# Patient Record
Sex: FEMALE | Race: BLACK OR AFRICAN AMERICAN | NOT HISPANIC OR LATINO | Employment: FULL TIME | URBAN - METROPOLITAN AREA
[De-identification: names, ages, dates, MRNs, and addresses within clinical notes are randomized per-mention and may not be internally consistent; named-entity substitution may affect disease eponyms.]

---

## 2017-01-30 ENCOUNTER — APPOINTMENT (OUTPATIENT)
Dept: WOMENS IMAGING | Facility: HOSPITAL | Age: 52
End: 2017-01-30

## 2017-01-30 PROCEDURE — 77067 SCR MAMMO BI INCL CAD: CPT | Performed by: RADIOLOGY

## 2017-01-30 PROCEDURE — G0202 SCR MAMMO BI INCL CAD: HCPCS | Performed by: RADIOLOGY

## 2017-08-08 ENCOUNTER — HOSPITAL ENCOUNTER (EMERGENCY)
Dept: HOSPITAL 23 - CED | Age: 52
Discharge: HOME | End: 2017-08-08
Payer: COMMERCIAL
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
## 2017-08-08 DIAGNOSIS — I10: ICD-10-CM

## 2017-08-08 DIAGNOSIS — Z88.0: ICD-10-CM

## 2017-08-08 DIAGNOSIS — S83.91XA: Primary | ICD-10-CM
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
## 2017-11-09 ENCOUNTER — OFFICE VISIT (OUTPATIENT)
Dept: OBSTETRICS AND GYNECOLOGY | Facility: CLINIC | Age: 52
End: 2017-11-09

## 2017-11-09 VITALS
WEIGHT: 147 LBS | BODY MASS INDEX: 28.86 KG/M2 | HEART RATE: 74 BPM | DIASTOLIC BLOOD PRESSURE: 83 MMHG | HEIGHT: 60 IN | SYSTOLIC BLOOD PRESSURE: 159 MMHG

## 2017-11-09 DIAGNOSIS — Z01.419 ENCOUNTER FOR GYNECOLOGICAL EXAMINATION WITHOUT ABNORMAL FINDING: ICD-10-CM

## 2017-11-09 DIAGNOSIS — Z12.11 COLON CANCER SCREENING: Primary | ICD-10-CM

## 2017-11-09 LAB — HEMOCCULT STL QL IA: NEGATIVE

## 2017-11-09 PROCEDURE — 82274 ASSAY TEST FOR BLOOD FECAL: CPT | Performed by: OBSTETRICS & GYNECOLOGY

## 2017-11-09 PROCEDURE — 99396 PREV VISIT EST AGE 40-64: CPT | Performed by: OBSTETRICS & GYNECOLOGY

## 2017-11-09 RX ORDER — LISINOPRIL 20 MG/1
TABLET ORAL
Refills: 0 | COMMUNITY
Start: 2017-08-04

## 2017-11-09 NOTE — PROGRESS NOTES
Subjective   Zahraa Sneed is a 52 y.o. female1  1, Para 1 AB 0, Living 1.  Last annual , last pap , last mammogram 2016, last colonoscopy 0.  Cc: Annual exam  History of Present Illness  She denies any gynecologic problems at this time  The following portions of the patient's history were reviewed and updated as appropriate: allergies, current medications, past family history, past medical history, past social history, past surgical history and problem list.    Review of Systems   Constitutional: Negative for activity change, fatigue, fever and unexpected weight change.   HENT: Negative for hearing loss, sore throat and voice change.    Respiratory: Negative for cough, chest tightness, shortness of breath and wheezing.    Cardiovascular: Negative for chest pain, palpitations and leg swelling.   Gastrointestinal: Negative for abdominal distention, abdominal pain, anal bleeding, blood in stool, constipation, diarrhea, nausea, rectal pain and vomiting.   Endocrine: Negative for cold intolerance and heat intolerance.   Genitourinary: Negative for difficulty urinating, dyspareunia, dysuria, flank pain, frequency, genital sores, menstrual problem, pelvic pain, urgency, vaginal bleeding, vaginal discharge and vaginal pain.   Musculoskeletal: Negative for arthralgias and back pain.   Skin: Negative for rash.   Allergic/Immunologic: Negative for environmental allergies and food allergies.   Neurological: Negative for dizziness, tremors, syncope, weakness, light-headedness, numbness and headaches.   Hematological: Negative for adenopathy. Does not bruise/bleed easily.   Psychiatric/Behavioral: Negative for agitation, behavioral problems, self-injury, sleep disturbance and suicidal ideas. The patient is not nervous/anxious and is not hyperactive.          Past Medical History:   Diagnosis Date   • History of DVT (deep vein thrombosis)    • Hypertension      Menstrual History:  OB History      Para  "Term  AB Living    1 1 1   1    SAB TAB Ectopic Multiple Live Births                 Menarche age: 15  No LMP recorded. Patient has had a hysterectomy.       Past Surgical History:   Procedure Laterality Date   • TOTAL ABDOMINAL HYSTERECTOMY      supracervical     OB History      Para Term  AB Living    1 1 1   1    SAB TAB Ectopic Multiple Live Births                Family History   Problem Relation Age of Onset   • Arthritis Mother    • Hypertension Mother    • Diabetes Mother    • Arthritis Father    • Hypertension Father      History   Smoking Status   • Never Smoker   Smokeless Tobacco   • Never Used     History   Alcohol Use   • Yes     Comment: Occasional     Health Maintenance   Topic Date Due   • COLONOSCOPY  2017   • MAMMOGRAM  2017   • PAP SMEAR  2018   • INFLUENZA VACCINE  Excluded   • TDAP/TD VACCINES  Excluded       Current Outpatient Prescriptions:   •  lisinopril (PRINIVIL,ZESTRIL) 20 MG tablet, TK 1 T PO QD, Disp: , Rfl: 0  Sexual History:Not active  STD: Negative     Objective   Vitals:    17 1537   BP: 159/83   Pulse: 74   Weight: 147 lb (66.7 kg)   Height: 60\" (152.4 cm)     Physical Exam   Constitutional: She is oriented to person, place, and time. She appears well-developed and well-nourished.   HENT:   Head: Normocephalic.   Eyes: Pupils are equal, round, and reactive to light.   Neck: Normal range of motion. No thyromegaly present.   Cardiovascular: Normal rate, regular rhythm, normal heart sounds and intact distal pulses.    Pulmonary/Chest: Effort normal and breath sounds normal. No respiratory distress. She exhibits no tenderness. Right breast exhibits no inverted nipple, no mass, no nipple discharge, no skin change and no tenderness. Left breast exhibits no inverted nipple, no mass, no nipple discharge, no skin change and no tenderness. Breasts are symmetrical.   Abdominal: Soft. Bowel sounds are normal. Hernia confirmed negative in the right " inguinal area and confirmed negative in the left inguinal area.   Genitourinary: Rectum normal and vagina normal. Rectal exam shows no external hemorrhoid, no internal hemorrhoid, no fissure, no mass, no tenderness and anal tone normal. No breast tenderness or discharge. Pelvic exam was performed with patient supine. There is no rash, tenderness, lesion or injury on the right labia. There is no rash, tenderness, lesion or injury on the left labia. Cervix exhibits no motion tenderness, no discharge and no friability. Right adnexum displays no mass, no tenderness and no fullness. Left adnexum displays no mass, no tenderness and no fullness.   Genitourinary Comments: Uterus surgically absent   Lymphadenopathy:     She has no cervical adenopathy.        Right: No inguinal adenopathy present.        Left: No inguinal adenopathy present.   Neurological: She is alert and oriented to person, place, and time. She has normal reflexes.   Skin: Skin is warm and dry.   Psychiatric: She has a normal mood and affect. Her behavior is normal. Judgment and thought content normal.         Assessment/Plan   Zahraa was seen today for gynecologic exam.    Diagnoses and all orders for this visit:    Colon cancer screening  -     POC FECAL OCCULT BLOOD BY IMMUNOASSAY    Encounter for gynecological examination without abnormal finding    Patient counseled about breast self-examinations, Pap smears, mammograms, colonoscopy

## 2018-02-01 ENCOUNTER — APPOINTMENT (OUTPATIENT)
Dept: WOMENS IMAGING | Facility: HOSPITAL | Age: 53
End: 2018-02-01

## 2018-02-01 PROCEDURE — 77067 SCR MAMMO BI INCL CAD: CPT | Performed by: RADIOLOGY

## 2018-03-04 ENCOUNTER — APPOINTMENT (OUTPATIENT)
Dept: NUCLEAR MEDICINE | Facility: HOSPITAL | Age: 53
End: 2018-03-04

## 2018-03-04 ENCOUNTER — HOSPITAL ENCOUNTER (EMERGENCY)
Facility: HOSPITAL | Age: 53
Discharge: HOME | End: 2018-03-04
Attending: EMERGENCY MEDICINE | Admitting: EMERGENCY MEDICINE

## 2018-03-04 ENCOUNTER — APPOINTMENT (OUTPATIENT)
Dept: GENERAL RADIOLOGY | Facility: HOSPITAL | Age: 53
End: 2018-03-04

## 2018-03-04 VITALS
HEIGHT: 60 IN | DIASTOLIC BLOOD PRESSURE: 83 MMHG | HEART RATE: 52 BPM | SYSTOLIC BLOOD PRESSURE: 140 MMHG | OXYGEN SATURATION: 97 % | TEMPERATURE: 97.8 F | BODY MASS INDEX: 28.07 KG/M2 | RESPIRATION RATE: 16 BRPM | WEIGHT: 143 LBS

## 2018-03-04 DIAGNOSIS — R07.89 CHEST WALL PAIN: Primary | ICD-10-CM

## 2018-03-04 LAB
ALBUMIN SERPL-MCNC: 4.5 G/DL (ref 3.5–5.2)
ALBUMIN/GLOB SERPL: 1.3 G/DL
ALP SERPL-CCNC: 127 U/L (ref 39–117)
ALT SERPL W P-5'-P-CCNC: 15 U/L (ref 1–33)
ANION GAP SERPL CALCULATED.3IONS-SCNC: 11.3 MMOL/L
AST SERPL-CCNC: 18 U/L (ref 1–32)
BASOPHILS # BLD AUTO: 0.03 10*3/MM3 (ref 0–0.2)
BASOPHILS NFR BLD AUTO: 0.6 % (ref 0–1.5)
BILIRUB SERPL-MCNC: 0.7 MG/DL (ref 0.1–1.2)
BUN BLD-MCNC: 10 MG/DL (ref 6–20)
BUN/CREAT SERPL: 12.8 (ref 7–25)
CALCIUM SPEC-SCNC: 10.1 MG/DL (ref 8.6–10.5)
CHLORIDE SERPL-SCNC: 104 MMOL/L (ref 98–107)
CO2 SERPL-SCNC: 27.7 MMOL/L (ref 22–29)
CREAT BLD-MCNC: 0.78 MG/DL (ref 0.57–1)
D DIMER PPP FEU-MCNC: 0.77 MCGFEU/ML (ref 0–0.49)
DEPRECATED RDW RBC AUTO: 41.5 FL (ref 37–54)
EOSINOPHIL # BLD AUTO: 0.07 10*3/MM3 (ref 0–0.7)
EOSINOPHIL NFR BLD AUTO: 1.4 % (ref 0.3–6.2)
ERYTHROCYTE [DISTWIDTH] IN BLOOD BY AUTOMATED COUNT: 12.3 % (ref 11.7–13)
GFR SERPL CREATININE-BSD FRML MDRD: 94 ML/MIN/1.73
GLOBULIN UR ELPH-MCNC: 3.5 GM/DL
GLUCOSE BLD-MCNC: 111 MG/DL (ref 65–99)
HCT VFR BLD AUTO: 40.1 % (ref 35.6–45.5)
HGB BLD-MCNC: 13 G/DL (ref 11.9–15.5)
HOLD SPECIMEN: NORMAL
HOLD SPECIMEN: NORMAL
IMM GRANULOCYTES # BLD: 0 10*3/MM3 (ref 0–0.03)
IMM GRANULOCYTES NFR BLD: 0 % (ref 0–0.5)
INR PPP: 1.01 (ref 0.9–1.1)
LYMPHOCYTES # BLD AUTO: 2.45 10*3/MM3 (ref 0.9–4.8)
LYMPHOCYTES NFR BLD AUTO: 49.7 % (ref 19.6–45.3)
MCH RBC QN AUTO: 30.3 PG (ref 26.9–32)
MCHC RBC AUTO-ENTMCNC: 32.4 G/DL (ref 32.4–36.3)
MCV RBC AUTO: 93.5 FL (ref 80.5–98.2)
MONOCYTES # BLD AUTO: 0.38 10*3/MM3 (ref 0.2–1.2)
MONOCYTES NFR BLD AUTO: 7.7 % (ref 5–12)
NEUTROPHILS # BLD AUTO: 2 10*3/MM3 (ref 1.9–8.1)
NEUTROPHILS NFR BLD AUTO: 40.6 % (ref 42.7–76)
PLATELET # BLD AUTO: 226 10*3/MM3 (ref 140–500)
PMV BLD AUTO: 10.9 FL (ref 6–12)
POTASSIUM BLD-SCNC: 4.3 MMOL/L (ref 3.5–5.2)
PROT SERPL-MCNC: 8 G/DL (ref 6–8.5)
PROTHROMBIN TIME: 13.1 SECONDS (ref 11.7–14.2)
RBC # BLD AUTO: 4.29 10*6/MM3 (ref 3.9–5.2)
SODIUM BLD-SCNC: 143 MMOL/L (ref 136–145)
TROPONIN T SERPL-MCNC: <0.01 NG/ML (ref 0–0.03)
WBC NRBC COR # BLD: 4.93 10*3/MM3 (ref 4.5–10.7)
WHOLE BLOOD HOLD SPECIMEN: NORMAL
WHOLE BLOOD HOLD SPECIMEN: NORMAL

## 2018-03-04 PROCEDURE — 0 XENON XE 133: Performed by: EMERGENCY MEDICINE

## 2018-03-04 PROCEDURE — A9540 TC99M MAA: HCPCS | Performed by: EMERGENCY MEDICINE

## 2018-03-04 PROCEDURE — 71045 X-RAY EXAM CHEST 1 VIEW: CPT

## 2018-03-04 PROCEDURE — 85379 FIBRIN DEGRADATION QUANT: CPT | Performed by: EMERGENCY MEDICINE

## 2018-03-04 PROCEDURE — A9558 XE133 XENON 10MCI: HCPCS | Performed by: EMERGENCY MEDICINE

## 2018-03-04 PROCEDURE — 93005 ELECTROCARDIOGRAM TRACING: CPT | Performed by: EMERGENCY MEDICINE

## 2018-03-04 PROCEDURE — 84484 ASSAY OF TROPONIN QUANT: CPT | Performed by: EMERGENCY MEDICINE

## 2018-03-04 PROCEDURE — 0 TECHNETIUM ALBUMIN AGGREGATED: Performed by: EMERGENCY MEDICINE

## 2018-03-04 PROCEDURE — 78582 LUNG VENTILAT&PERFUS IMAGING: CPT

## 2018-03-04 PROCEDURE — 85610 PROTHROMBIN TIME: CPT | Performed by: EMERGENCY MEDICINE

## 2018-03-04 PROCEDURE — 99285 EMERGENCY DEPT VISIT HI MDM: CPT

## 2018-03-04 PROCEDURE — 85025 COMPLETE CBC W/AUTO DIFF WBC: CPT | Performed by: EMERGENCY MEDICINE

## 2018-03-04 PROCEDURE — 93010 ELECTROCARDIOGRAM REPORT: CPT | Performed by: INTERNAL MEDICINE

## 2018-03-04 PROCEDURE — 80053 COMPREHEN METABOLIC PANEL: CPT | Performed by: EMERGENCY MEDICINE

## 2018-03-04 RX ORDER — ASPIRIN 325 MG
325 TABLET ORAL ONCE
Status: DISCONTINUED | OUTPATIENT
Start: 2018-03-04 | End: 2018-03-04

## 2018-03-04 RX ORDER — SODIUM CHLORIDE 0.9 % (FLUSH) 0.9 %
10 SYRINGE (ML) INJECTION AS NEEDED
Status: DISCONTINUED | OUTPATIENT
Start: 2018-03-04 | End: 2018-03-04 | Stop reason: HOSPADM

## 2018-03-04 RX ADMIN — XENON XE-133 8.55 MILLICURIE: 10 GAS RESPIRATORY (INHALATION) at 13:38

## 2018-03-04 RX ADMIN — Medication 1 DOSE: at 13:38

## 2018-03-04 NOTE — ED TRIAGE NOTES
Pt sent from [facility] for chest discomfort increased this morning. Pt also reports indigestion.

## 2018-03-04 NOTE — ED PROVIDER NOTES
EMERGENCY DEPARTMENT ENCOUNTER    CHIEF COMPLAINT  Chief Complaint: chest pain   History given by: patient  History limited by: N/A  Room Number: 39/39  PMD: Hodan Lowery MD      HPI:  Pt is a 52 y.o. female who presents with intermittent nonradiating upper central chest pain (described as indigestion and burning) that started about 2 days ago after she ate spicy chicken wings. It is exacerbated by eating food and lasts for a few minutes at a time. It is not aggravated by exertion or movement. She has also had increased belching and recent stress. She denies nausea, vomiting, sweating, trouble breathing, dizziness, weakness, abd pain, fevers, chills, pain and difficulty with urination, personal hx of diabetes, and personal hx and family hx of heart disease. She reports that she has had these sx intermittently since starting atorvastatin for hyperlipidemia in 1/2018 and has been taking gas x for sx without significant relief. She also states that she was seen at [facility] earlier today and was referred to ED for further evaluation. Past Medical History of HTN, hyperlipidemia, and DVT.     Duration: started about 2 days ago  Timing: intermittent  Location: upper central chest  Radiation: none  Quality: indigestion and burning  Intensity/Severity: moderate  Progression: unchanged  Associated Symptoms: increased belching  Aggravating Factors: eating food  Alleviating Factors: none  Previous Episodes: Pt reports that she has had these sx intermittently since starting atorvastatin for hyperlipidemia in 1/2018.   Treatment before arrival: Pt states that she has taken gas x for sx without significant relief. She also reports that she was seen at [facility] earlier today and was referred to ED for further evaluation.    PAST MEDICAL HISTORY  Active Ambulatory Problems     Diagnosis Date Noted   • Encounter for gynecological examination without abnormal finding 08/23/2016     Resolved Ambulatory Problems     Diagnosis Date Noted   • No  Resolved Ambulatory Problems     Past Medical History:   Diagnosis Date   • History of DVT (deep vein thrombosis)    • Hypertension        PAST SURGICAL HISTORY  Past Surgical History:   Procedure Laterality Date   • TOTAL ABDOMINAL HYSTERECTOMY      supracervical       FAMILY HISTORY  Family History   Problem Relation Age of Onset   • Arthritis Mother    • Hypertension Mother    • Diabetes Mother    • Arthritis Father    • Hypertension Father        SOCIAL HISTORY  Social History     Social History   • Marital status:      Spouse name: N/A   • Number of children: N/A   • Years of education: N/A     Occupational History   • Not on file.     Social History Main Topics   • Smoking status: Never Smoker   • Smokeless tobacco: Never Used   • Alcohol use Yes      Comment: Occasional   • Drug use: No   • Sexual activity: No     Other Topics Concern   • Not on file     Social History Narrative   • No narrative on file         ALLERGIES  Dilaudid [hydromorphone]; Iodinated diagnostic agents; and Penicillins    REVIEW OF SYSTEMS  Review of Systems   Constitutional: Negative for chills, diaphoresis and fever.   HENT: Negative for sore throat.    Respiratory: Negative for cough and shortness of breath.    Cardiovascular: Positive for chest pain (upper central chest pain).   Gastrointestinal: Negative for abdominal pain, diarrhea, nausea and vomiting.        Increased belching   Genitourinary: Negative for difficulty urinating and dysuria.   Musculoskeletal: Negative for back pain.   Skin: Negative for rash.   Neurological: Negative for dizziness and weakness.   Psychiatric/Behavioral: The patient is not nervous/anxious.        PHYSICAL EXAM  ED Triage Vitals   Temp Heart Rate Resp BP SpO2   03/04/18 1120 03/04/18 1112 03/04/18 1120 03/04/18 1112 03/04/18 1112   97.8 °F (36.6 °C) 52 16 159/92 100 % WNL     Physical Exam   Constitutional: She is oriented to person, place, and time and well-developed, well-nourished, and  in no distress.   HENT:   Head: Normocephalic.   Mouth/Throat: Mucous membranes are normal.   Eyes: No scleral icterus.   Neck: Normal range of motion.   Cardiovascular: Normal rate, regular rhythm and normal heart sounds.    Pulmonary/Chest: Effort normal and breath sounds normal. No respiratory distress. She exhibits no tenderness.   Abdominal: Soft. There is no tenderness. There is no rebound and no guarding.   Musculoskeletal: Normal range of motion.   Neurological: She is alert and oriented to person, place, and time. She has normal motor skills and normal sensation.   Skin: Skin is warm and dry.   Psychiatric: Mood and affect normal.   Nursing note and vitals reviewed.      LAB RESULTS  Recent Results (from the past 24 hour(s))   Comprehensive Metabolic Panel    Collection Time: 03/04/18 11:32 AM   Result Value Ref Range    Glucose 111 (H) 65 - 99 mg/dL    BUN 10 6 - 20 mg/dL    Creatinine 0.78 0.57 - 1.00 mg/dL    Sodium 143 136 - 145 mmol/L    Potassium 4.3 3.5 - 5.2 mmol/L    Chloride 104 98 - 107 mmol/L    CO2 27.7 22.0 - 29.0 mmol/L    Calcium 10.1 8.6 - 10.5 mg/dL    Total Protein 8.0 6.0 - 8.5 g/dL    Albumin 4.50 3.50 - 5.20 g/dL    ALT (SGPT) 15 1 - 33 U/L    AST (SGOT) 18 1 - 32 U/L    Alkaline Phosphatase 127 (H) 39 - 117 U/L    Total Bilirubin 0.7 0.1 - 1.2 mg/dL    eGFR  African Amer 94 >60 mL/min/1.73    Globulin 3.5 gm/dL    A/G Ratio 1.3 g/dL    BUN/Creatinine Ratio 12.8 7.0 - 25.0    Anion Gap 11.3 mmol/L   Troponin    Collection Time: 03/04/18 11:32 AM   Result Value Ref Range    Troponin T <0.010 0.000 - 0.030 ng/mL   Protime-INR    Collection Time: 03/04/18 11:32 AM   Result Value Ref Range    Protime 13.1 11.7 - 14.2 Seconds    INR 1.01 0.90 - 1.10   D-dimer, Quantitative    Collection Time: 03/04/18 11:32 AM   Result Value Ref Range    D-Dimer, Quantitative 0.77 (H) 0.00 - 0.49 MCGFEU/mL   Light Blue Top    Collection Time: 03/04/18 11:32 AM   Result Value Ref Range    Extra Tube hold for  add-on    Green Top (Gel)    Collection Time: 03/04/18 11:32 AM   Result Value Ref Range    Extra Tube Hold for add-ons.    Lavender Top    Collection Time: 03/04/18 11:32 AM   Result Value Ref Range    Extra Tube hold for add-on    Gold Top - SST    Collection Time: 03/04/18 11:32 AM   Result Value Ref Range    Extra Tube Hold for add-ons.    CBC Auto Differential    Collection Time: 03/04/18 11:32 AM   Result Value Ref Range    WBC 4.93 4.50 - 10.70 10*3/mm3    RBC 4.29 3.90 - 5.20 10*6/mm3    Hemoglobin 13.0 11.9 - 15.5 g/dL    Hematocrit 40.1 35.6 - 45.5 %    MCV 93.5 80.5 - 98.2 fL    MCH 30.3 26.9 - 32.0 pg    MCHC 32.4 32.4 - 36.3 g/dL    RDW 12.3 11.7 - 13.0 %    RDW-SD 41.5 37.0 - 54.0 fl    MPV 10.9 6.0 - 12.0 fL    Platelets 226 140 - 500 10*3/mm3    Neutrophil % 40.6 (L) 42.7 - 76.0 %    Lymphocyte % 49.7 (H) 19.6 - 45.3 %    Monocyte % 7.7 5.0 - 12.0 %    Eosinophil % 1.4 0.3 - 6.2 %    Basophil % 0.6 0.0 - 1.5 %    Immature Grans % 0.0 0.0 - 0.5 %    Neutrophils, Absolute 2.00 1.90 - 8.10 10*3/mm3    Lymphocytes, Absolute 2.45 0.90 - 4.80 10*3/mm3    Monocytes, Absolute 0.38 0.20 - 1.20 10*3/mm3    Eosinophils, Absolute 0.07 0.00 - 0.70 10*3/mm3    Basophils, Absolute 0.03 0.00 - 0.20 10*3/mm3    Immature Grans, Absolute 0.00 0.00 - 0.03 10*3/mm3       I ordered the above labs and reviewed the results    RADIOLOGY           XR Chest 1 View (Final result) Result time: 03/04/18 12:07:35     Final result by Scotty Limon MD (03/04/18 12:07:35)     Impression:     No evidence for acute pulmonary process. Follow-up as  clinical indications persist.     This report was finalized on 3/4/2018 12:07 PM by Dr. Scotty Limon MD.        Narrative:     XR CHEST 1 VW-     HISTORY: Female who is 52 years-old,  chest pain     TECHNIQUE: Frontal view of the chest     COMPARISON: None available     FINDINGS: Heart, mediastinum and pulmonary vasculature are unremarkable.  No focal pulmonary consolidation,  pleural effusion, or pneumothorax. No  acute osseous process.               NM Lung Ventilation Perfusion (Final result) Result time: 03/04/18 13:51:16     Final result by Scotty Limon MD (03/04/18 13:51:16)     Impression:        No perfusion deficits to suggest pulmonary embolism. If there is further  clinical concern, CT angiography of the chest can be obtained if not  contraindicated.     This report was finalized on 3/4/2018 1:51 PM by Dr. Scotty Limon MD.        Narrative:     NM LUNG VENTILATION PERFUSION-     INDICATIONS: Chest tightness, possible pulmonary embolism     TECHNIQUE: Pulmonary VENTILATION/PERFUSION SCAN     COMPARISON: None available     FINDINGS:      Radiotracer:     8.55 mCi xenon-133 inhaled.     5.4 mCi technetium MAA intravenous     The ventilation images show normal symmetric ventilation. Washout occurs  with delayed that may reflect COPD.     The perfusion images show no perfusion deficits to suggest pulmonary  embolism.            I ordered the above noted radiological studies and reviewed the images on the PACS system.         EKG    EKG was interpreted by Dr. Lee. See Dr Lee's note for EKG interpretation.         PROGRESS AND CONSULTS  11:16 AM- Ordered 325mg ASA per chest pain protocol. Ordered CXR, blood work, d-dimer, PT with INR, troponin, and EKG for further evaluation.   12:03 PM- Per RN, pt has already had 324mg ASA today. Discontinued order for 325mg ASA.   12:04 PM- D-dimer is elevated at 0.77. Ordered CTA chest for further evaluation and to rule out pulmonary embolism.   12:07 PM- Reviewed pt's history and workup with Dr. Lee.  At bedside evaluation, they agree with the plan of care.  12:08 PM- Rechecked pt. She is resting comfortably and is in no acute distress. Discussed with pt about all pertinent results obtained so far including CXR findings (no acute process), negative troponin, normal WBC count, and elevated d-dimer. Informed pt of plan to obtain  "CTA chest for further evaluation and to rule out pulmonary embolism. Pt verbalizes understanding and agreement with plan.   12:25 PM- Pt now states that she has allergy to IV contrast. She reports that in the past, when she received IV contrast, she developed kidney failure and consequently had to undergo dialysis.   12:49 PM- Canceled CTA chest. Ordered VQ scan to rule out pulmonary embolism.   2:00 PM- Obtained VQ scan results-> shows no perfusion deficits to suggest pulmonary embolism.   2:30 PM- Pt was discharged per Dr Lee. See Dr Lee's note for further details.         DIAGNOSIS  Final diagnoses:   Chest wall pain       FOLLOW UP   Hodan Lowery MD  [address] [id number]  [phone]              COURSE & MEDICAL DECISION MAKING  Pertinent Labs and Imaging studies that were ordered and reviewed are noted above.  Results were reviewed/discussed with the patient and they were also made aware of online assess.   Pt also made aware that some labs, such as cultures, will not be resulted during ER visit and follow up with PMD is necessary.     MEDICATIONS GIVEN IN ER  Medications   sodium chloride 0.9 % flush 10 mL (not administered)   xenon xe 133 gas 10 mCi 8.55 millicurie (8.55 millicuries Inhalation Given 3/4/18 1338)   technetium albumin aggregated (MAA) solution 1 dose (1 dose Intravenous Given 3/4/18 1338)       /83  Pulse 52  Temp 97.8 °F (36.6 °C) (Oral)   Resp 16  Ht 152.4 cm (60\")  Wt 64.9 kg (143 lb)  SpO2 97%  BMI 27.93 kg/m2      I personally reviewed the past medical history, past surgical history, social history, family history, current medications and allergies as they appear in this chart.  The scribe's note accurately reflects the work and decisions made by me.     Documentation assistance provided by sanjiv Owens for ENRICO Rosales on 3/4/2018 at 3:11 PM. Information recorded by the scribe was done at my direction and has been verified and " validated by me.     Coral Owens  03/04/18 1516       Cynthia Godfrey, HALINA  03/04/18 1534

## 2018-03-04 NOTE — ED PROVIDER NOTES
Pt presents to ED c/o chest pain that began 2 days ago.     PHYSICAL EXAM    Constitutional: A&O x 3, pt in no acute distress  HENT:  Unremarkable  Musculoskeletal: Nml    Discussed labs and radiology that r/o PE and blood clots. Plan to discharge and f/u with PCP. Pt understands and agrees with the plan, all questions answered.    I supervised care provided by the midlevel provider.    We have discussed this patient's history, physical exam, and treatment plan.   I have reviewed the note and personally saw and examined the patient and agree with the plan of care.    Documentation assistance provided by sanjiv Howell for Dr. Lee.  Information recorded by the scribe was done at my direction and has been verified and validated by me.         Melissa Howell  03/04/18 8030       Fito Lee MD  03/04/18 [id number]

## 2019-03-07 ENCOUNTER — APPOINTMENT (OUTPATIENT)
Dept: WOMENS IMAGING | Facility: HOSPITAL | Age: 54
End: 2019-03-07

## 2019-03-07 PROCEDURE — 77067 SCR MAMMO BI INCL CAD: CPT | Performed by: RADIOLOGY

## 2020-11-10 ENCOUNTER — APPOINTMENT (OUTPATIENT)
Dept: WOMENS IMAGING | Facility: HOSPITAL | Age: 55
End: 2020-11-10

## 2020-11-10 PROCEDURE — 77067 SCR MAMMO BI INCL CAD: CPT | Performed by: RADIOLOGY

## 2020-11-10 PROCEDURE — 77063 BREAST TOMOSYNTHESIS BI: CPT | Performed by: RADIOLOGY

## 2020-12-01 ENCOUNTER — TELEPHONE (OUTPATIENT)
Dept: OBSTETRICS AND GYNECOLOGY | Facility: CLINIC | Age: 55
End: 2020-12-01

## 2021-07-21 RX ORDER — FLUCONAZOLE 150 MG/1
150 TABLET ORAL DAILY
Qty: 2 TABLET | Refills: 0 | Status: SHIPPED | OUTPATIENT
Start: 2021-07-21

## 2021-12-13 ENCOUNTER — APPOINTMENT (OUTPATIENT)
Dept: WOMENS IMAGING | Facility: HOSPITAL | Age: 56
End: 2021-12-13

## 2021-12-13 PROCEDURE — 77063 BREAST TOMOSYNTHESIS BI: CPT | Performed by: RADIOLOGY

## 2021-12-13 PROCEDURE — 77067 SCR MAMMO BI INCL CAD: CPT | Performed by: RADIOLOGY

## 2022-12-22 ENCOUNTER — APPOINTMENT (OUTPATIENT)
Dept: WOMENS IMAGING | Facility: HOSPITAL | Age: 57
End: 2022-12-22
Payer: COMMERCIAL

## 2022-12-22 PROCEDURE — 77063 BREAST TOMOSYNTHESIS BI: CPT | Performed by: RADIOLOGY

## 2022-12-22 PROCEDURE — 77067 SCR MAMMO BI INCL CAD: CPT | Performed by: RADIOLOGY

## 2023-12-27 ENCOUNTER — APPOINTMENT (OUTPATIENT)
Dept: WOMENS IMAGING | Facility: HOSPITAL | Age: 58
End: 2023-12-27
Payer: COMMERCIAL

## 2023-12-27 PROCEDURE — 77063 BREAST TOMOSYNTHESIS BI: CPT | Performed by: RADIOLOGY

## 2023-12-27 PROCEDURE — 77067 SCR MAMMO BI INCL CAD: CPT | Performed by: RADIOLOGY

## 2024-10-16 ENCOUNTER — TRANSCRIBE ORDERS (OUTPATIENT)
Dept: ADMINISTRATIVE | Facility: HOSPITAL | Age: 59
End: 2024-10-16

## 2024-10-16 DIAGNOSIS — N87.9 DYSPLASIA OF CERVIX: Primary | ICD-10-CM

## 2025-01-16 ENCOUNTER — APPOINTMENT (OUTPATIENT)
Dept: WOMENS IMAGING | Facility: HOSPITAL | Age: 60
End: 2025-01-16
Payer: COMMERCIAL

## 2025-01-16 PROCEDURE — 77067 SCR MAMMO BI INCL CAD: CPT | Performed by: RADIOLOGY

## 2025-01-16 PROCEDURE — 77063 BREAST TOMOSYNTHESIS BI: CPT | Performed by: RADIOLOGY
